# Patient Record
Sex: MALE | Race: WHITE | URBAN - METROPOLITAN AREA
[De-identification: names, ages, dates, MRNs, and addresses within clinical notes are randomized per-mention and may not be internally consistent; named-entity substitution may affect disease eponyms.]

---

## 2019-10-13 ENCOUNTER — EMERGENCY (EMERGENCY)
Facility: HOSPITAL | Age: 59
LOS: 0 days | Discharge: HOME | End: 2019-10-13
Attending: EMERGENCY MEDICINE
Payer: COMMERCIAL

## 2019-10-13 VITALS
DIASTOLIC BLOOD PRESSURE: 100 MMHG | SYSTOLIC BLOOD PRESSURE: 135 MMHG | HEIGHT: 75 IN | RESPIRATION RATE: 18 BRPM | WEIGHT: 233.03 LBS | OXYGEN SATURATION: 100 % | TEMPERATURE: 97 F | HEART RATE: 102 BPM

## 2019-10-13 VITALS
HEART RATE: 78 BPM | OXYGEN SATURATION: 100 % | RESPIRATION RATE: 18 BRPM | DIASTOLIC BLOOD PRESSURE: 94 MMHG | SYSTOLIC BLOOD PRESSURE: 128 MMHG | TEMPERATURE: 97 F

## 2019-10-13 DIAGNOSIS — R04.0 EPISTAXIS: ICD-10-CM

## 2019-10-13 LAB
ANION GAP SERPL CALC-SCNC: 7 MMOL/L — SIGNIFICANT CHANGE UP (ref 5–17)
APTT BLD: 34.4 SEC — SIGNIFICANT CHANGE UP (ref 27.5–36.3)
BASOPHILS # BLD AUTO: 0.04 K/UL — SIGNIFICANT CHANGE UP (ref 0–0.2)
BASOPHILS NFR BLD AUTO: 0.7 % — SIGNIFICANT CHANGE UP (ref 0–2)
BUN SERPL-MCNC: 13 MG/DL — SIGNIFICANT CHANGE UP (ref 7–23)
CALCIUM SERPL-MCNC: 8.5 MG/DL — SIGNIFICANT CHANGE UP (ref 8.5–10.1)
CHLORIDE SERPL-SCNC: 107 MMOL/L — SIGNIFICANT CHANGE UP (ref 96–108)
CO2 SERPL-SCNC: 24 MMOL/L — SIGNIFICANT CHANGE UP (ref 22–31)
CREAT SERPL-MCNC: 0.84 MG/DL — SIGNIFICANT CHANGE UP (ref 0.5–1.3)
EOSINOPHIL # BLD AUTO: 0.2 K/UL — SIGNIFICANT CHANGE UP (ref 0–0.5)
EOSINOPHIL NFR BLD AUTO: 3.3 % — SIGNIFICANT CHANGE UP (ref 0–6)
GLUCOSE SERPL-MCNC: 132 MG/DL — HIGH (ref 70–99)
HCT VFR BLD CALC: 43.3 % — SIGNIFICANT CHANGE UP (ref 39–50)
HGB BLD-MCNC: 14.8 G/DL — SIGNIFICANT CHANGE UP (ref 13–17)
IMM GRANULOCYTES NFR BLD AUTO: 0.2 % — SIGNIFICANT CHANGE UP (ref 0–1.5)
INR BLD: 1.07 RATIO — SIGNIFICANT CHANGE UP (ref 0.88–1.16)
LYMPHOCYTES # BLD AUTO: 1.51 K/UL — SIGNIFICANT CHANGE UP (ref 1–3.3)
LYMPHOCYTES # BLD AUTO: 24.6 % — SIGNIFICANT CHANGE UP (ref 13–44)
MCHC RBC-ENTMCNC: 28.7 PG — SIGNIFICANT CHANGE UP (ref 27–34)
MCHC RBC-ENTMCNC: 34.2 GM/DL — SIGNIFICANT CHANGE UP (ref 32–36)
MCV RBC AUTO: 83.9 FL — SIGNIFICANT CHANGE UP (ref 80–100)
MONOCYTES # BLD AUTO: 0.25 K/UL — SIGNIFICANT CHANGE UP (ref 0–0.9)
MONOCYTES NFR BLD AUTO: 4.1 % — SIGNIFICANT CHANGE UP (ref 2–14)
NEUTROPHILS # BLD AUTO: 4.12 K/UL — SIGNIFICANT CHANGE UP (ref 1.8–7.4)
NEUTROPHILS NFR BLD AUTO: 67.1 % — SIGNIFICANT CHANGE UP (ref 43–77)
PLATELET # BLD AUTO: 254 K/UL — SIGNIFICANT CHANGE UP (ref 150–400)
POTASSIUM SERPL-MCNC: 3.6 MMOL/L — SIGNIFICANT CHANGE UP (ref 3.5–5.3)
POTASSIUM SERPL-SCNC: 3.6 MMOL/L — SIGNIFICANT CHANGE UP (ref 3.5–5.3)
PROTHROM AB SERPL-ACNC: 11.9 SEC — SIGNIFICANT CHANGE UP (ref 10–12.9)
RBC # BLD: 5.16 M/UL — SIGNIFICANT CHANGE UP (ref 4.2–5.8)
RBC # FLD: 13 % — SIGNIFICANT CHANGE UP (ref 10.3–14.5)
SODIUM SERPL-SCNC: 138 MMOL/L — SIGNIFICANT CHANGE UP (ref 135–145)
WBC # BLD: 6.13 K/UL — SIGNIFICANT CHANGE UP (ref 3.8–10.5)
WBC # FLD AUTO: 6.13 K/UL — SIGNIFICANT CHANGE UP (ref 3.8–10.5)

## 2019-10-13 PROCEDURE — 85730 THROMBOPLASTIN TIME PARTIAL: CPT

## 2019-10-13 PROCEDURE — 99283 EMERGENCY DEPT VISIT LOW MDM: CPT | Mod: 25

## 2019-10-13 PROCEDURE — 30901 CONTROL OF NOSEBLEED: CPT

## 2019-10-13 PROCEDURE — 36415 COLL VENOUS BLD VENIPUNCTURE: CPT

## 2019-10-13 PROCEDURE — 85610 PROTHROMBIN TIME: CPT

## 2019-10-13 PROCEDURE — 30901 CONTROL OF NOSEBLEED: CPT | Mod: RT

## 2019-10-13 PROCEDURE — 85025 COMPLETE CBC W/AUTO DIFF WBC: CPT

## 2019-10-13 PROCEDURE — 80048 BASIC METABOLIC PNL TOTAL CA: CPT

## 2019-10-13 PROCEDURE — 99283 EMERGENCY DEPT VISIT LOW MDM: CPT

## 2019-10-13 NOTE — ED ADULT NURSE NOTE - OBJECTIVE STATEMENT
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 2nd episode last night. And again this morning. Pt reports seeing PMD on Friday with lab work drawn. Pt unaware of results. Takes ASA 81mg daily. Denies recent trauma. Denies allergies, nasal congestion. [place] in [place] after living in  [place] 5 years. Denies N/V, pain/discomfort. 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 2nd episode last night. And again this morning. Pt reports seeing PMD on Friday with lab work drawn. Pt unaware of results. Takes ASA 81mg daily. Denies recent trauma. Denies allergies, nasal congestion. [place] in [place] living in  [place] 5 years. Denies N/V, pain/discomfort. No active bleeding noted at this time.

## 2019-10-13 NOTE — ED PROVIDER NOTE - ENMT, MLM
Airway patent, Mouth with normal mucosa. Throat has no vesicles, no oropharyngeal exudates and uvula is midline. +dried blood right nares. No bleeding of posterior pharynx.

## 2019-10-13 NOTE — ED PROVIDER NOTE - OBJECTIVE STATEMENT
60 y/o male with no relevant PMHx presents to the ED c/o epistaxis to right nares. Pt noticed bleeding 3 days ago. Saw PMD and had blood work but does not know results yet. Was told to follow up with ENT which he has not done yet. Right nares started bleeding this morning, bleeding stopped before he got to ED. Denies CP, SOB, fever, chills, n/v/d.

## 2019-10-13 NOTE — ED PROVIDER NOTE - CARE PROVIDER_API CALL
Kai Rodriguez)  Otolaryngology  [address] [place] [address]  Phone: [phone]  Fax: [phone]  Follow Up Time:

## 2019-10-13 NOTE — ED ADULT NURSE NOTE - CHIEF COMPLAINT QUOTE
Pt states he had intermittent episodes of nose bleed x 3 days. last episode 1hour pta. Seen by doctor and told everything was okay. Not on blood thinners. Bleeding stropped upon arrival. (+) Headache

## 2019-10-13 NOTE — ED ADULT NURSE NOTE - NSIMPLEMENTINTERV_GEN_ALL_ED
Implemented All Universal Safety Interventions:  [place] to call system. Call bell, personal items and telephone within reach. Instruct patient to call for assistance. Room bathroom lighting operational. Non-slip footwear when patient is off stretcher. Physically safe environment: no spills, clutter or unnecessary equipment. Stretcher in lowest position, wheels locked, appropriate side rails in place.

## 2019-10-13 NOTE — ED PROVIDER NOTE - PATIENT PORTAL LINK FT
You can access the FollowMyHealth Patient Portal offered by [facility] by registering at the following website: [email or web address]. By joining [facility]’s FollowMyHealth portal, you will also be able to view your health information using other applications (apps) compatible with our system.

## 2019-10-13 NOTE — ED ADULT TRIAGE NOTE - CHIEF COMPLAINT QUOTE
Pt states he had intermittent episodes of nose bleed x 3 days. last episode 1hour pta. Seen by doctor and told everything was okay. Not on blood thinners. Bleeding stropped upon arrival. Pt states he had intermittent episodes of nose bleed x 3 days. last episode 1hour pta. Seen by doctor and told everything was okay. Not on blood thinners. Bleeding stropped upon arrival. (+) Headache

## 2019-10-14 ENCOUNTER — APPOINTMENT (OUTPATIENT)
Dept: OTOLARYNGOLOGY | Facility: CLINIC | Age: 59
End: 2019-10-14
Payer: COMMERCIAL

## 2019-10-14 VITALS
HEART RATE: 104 BPM | BODY MASS INDEX: 28.97 KG/M2 | WEIGHT: 233 LBS | DIASTOLIC BLOOD PRESSURE: 82 MMHG | HEIGHT: 75 IN | SYSTOLIC BLOOD PRESSURE: 132 MMHG

## 2019-10-14 DIAGNOSIS — R04.0 EPISTAXIS: ICD-10-CM

## 2019-10-14 DIAGNOSIS — Z86.79 PERSONAL HISTORY OF OTHER DISEASES OF THE CIRCULATORY SYSTEM: ICD-10-CM

## 2019-10-14 DIAGNOSIS — Z72.89 OTHER PROBLEMS RELATED TO LIFESTYLE: ICD-10-CM

## 2019-10-14 DIAGNOSIS — J34.2 DEVIATED NASAL SEPTUM: ICD-10-CM

## 2019-10-14 DIAGNOSIS — Z80.9 FAMILY HISTORY OF MALIGNANT NEOPLASM, UNSPECIFIED: ICD-10-CM

## 2019-10-14 DIAGNOSIS — Z83.3 FAMILY HISTORY OF DIABETES MELLITUS: ICD-10-CM

## 2019-10-14 DIAGNOSIS — Z86.39 PERSONAL HISTORY OF OTHER ENDOCRINE, NUTRITIONAL AND METABOLIC DISEASE: ICD-10-CM

## 2019-10-14 DIAGNOSIS — F17.200 NICOTINE DEPENDENCE, UNSPECIFIED, UNCOMPLICATED: ICD-10-CM

## 2019-10-14 PROBLEM — Z78.9 OTHER SPECIFIED HEALTH STATUS: Chronic | Status: ACTIVE | Noted: 2019-10-13

## 2019-10-14 PROBLEM — Z00.00 ENCOUNTER FOR PREVENTIVE HEALTH EXAMINATION: Status: ACTIVE | Noted: 2019-10-14

## 2019-10-14 PROCEDURE — 99203 OFFICE O/P NEW LOW 30 MIN: CPT

## 2019-10-14 RX ORDER — FINASTERIDE 5 MG/1
5 TABLET, FILM COATED ORAL
Qty: 90 | Refills: 0 | Status: ACTIVE | COMMUNITY
Start: 2019-09-06

## 2019-10-14 RX ORDER — CHOLECALCIFEROL (VITAMIN D3) 1250 MCG
1.25 MG CAPSULE ORAL
Qty: 12 | Refills: 0 | Status: ACTIVE | COMMUNITY
Start: 2019-10-09

## 2019-10-14 RX ORDER — MUPIROCIN 20 MG/G
2 OINTMENT TOPICAL TWICE DAILY
Qty: 1 | Refills: 0 | Status: ACTIVE | COMMUNITY
Start: 2019-10-14 | End: 1900-01-01

## 2019-10-14 RX ORDER — RAMIPRIL 5 MG/1
5 CAPSULE ORAL
Qty: 90 | Refills: 0 | Status: ACTIVE | COMMUNITY
Start: 2019-09-06

## 2019-10-14 RX ORDER — AMLODIPINE BESYLATE 5 MG/1
5 TABLET ORAL
Qty: 90 | Refills: 0 | Status: ACTIVE | COMMUNITY
Start: 2019-09-06

## 2019-10-14 RX ORDER — ATORVASTATIN CALCIUM 40 MG/1
40 TABLET, FILM COATED ORAL
Qty: 90 | Refills: 0 | Status: ACTIVE | COMMUNITY
Start: 2019-09-06

## 2019-10-14 NOTE — HISTORY OF PRESENT ILLNESS
[de-identified] : Has had intermittend epistaxis for past 4 days ago.  Usually right side.  Occ heavy. Was seen in ED and cauterized- [facility].  No bleeding since.  No recent nasal issues.  On baby asa - not stopped.

## 2019-10-14 NOTE — PHYSICAL EXAM
[] : septum deviated to the right [de-identified] : s/p cautery - crusting right nostril   [Midline] : trachea located in midline position [Normal] : no rashes

## 2019-10-14 NOTE — ASSESSMENT
[FreeTextEntry1] : Patient with epistaxis which was treated at [facility] ED with cautery . No further bleeding.  Patient on baby asa. Does have some crusting in right nostril. Started on mupirocin ointment and nasal saline spray.  Recommended reduced activity, no noseblowing for 5 days, sneeze with mouth open.   Also discussed stopping ASA for one week if clear with primary MD.  Follow up as necessary

## 2019-10-14 NOTE — REVIEW OF SYSTEMS
[Nasal Congestion] : nasal congestion [Ear Drainage] : ear drainage [Problem Snoring] : problem snoring [Nose Bleeds] : nose bleeds [Swelling Neck] : swelling neck [Swelling Face] : face swelling [Negative] : Endocrine [Patient Intake Form Reviewed] : Patient intake form was reviewed